# Patient Record
Sex: FEMALE | Race: ASIAN
[De-identification: names, ages, dates, MRNs, and addresses within clinical notes are randomized per-mention and may not be internally consistent; named-entity substitution may affect disease eponyms.]

---

## 2024-03-12 ENCOUNTER — HOSPITAL ENCOUNTER (OUTPATIENT)
Facility: HOSPITAL | Age: 34
Setting detail: RECURRING SERIES
Discharge: HOME | End: 2024-03-15
Payer: OTHER GOVERNMENT

## 2024-03-12 PROCEDURE — 97140 MANUAL THERAPY 1/> REGIONS: CPT

## 2024-03-12 PROCEDURE — 97112 NEUROMUSCULAR REEDUCATION: CPT

## 2024-03-12 PROCEDURE — 97110 THERAPEUTIC EXERCISES: CPT

## 2024-03-12 NOTE — PROGRESS NOTES
PHYSICAL / OCCUPATIONAL THERAPY - DAILY TREATMENT NOTE    Patient Name: Nahun Durant    Date: 3/12/2024    : 1990  Insurance: Payor: [employer] / Plan: [employer] / Product Type: *No Product type* /      Patient  verified Yes     Visit #   Current / Total 6 9-13   Time   In / Out 5:01 5:40   Pain   In / Out 0- pain/dizzy 0   Subjective Functional Status/Changes: Pt has only had one brief episode of dizziness with turning head to the left at the gas station quickly but it was gone almost immediately. States she does well with 1st set of supine chin tucks, but 2nd set causes increased pain (reviewed HEP which is just for 1 set of 10 and pt reports understanding)     TREATMENT AREA =  Vertigo [R42]    OBJECTIVE     Modalities Rationale:     decrease inflammation and decrease pain to improve patient's ability to progress to PLOF and address remaining functional goals.     min [] Estim Unattended, type/location:                                      []  w/ice    []  w/heat    min [] Estim Attended, type/location:                                     []  w/US     []  w/ice    []  w/heat    []  TENS insruct      min []  Mechanical Traction: type/lbs                   []  pro   []  sup   []  int   []  cont    []  before manual    []  after manual    min []  Ultrasound, settings/location:     ND min  unbill [x]  Ice     []  Heat    location/position: C/s in supine    min []  Paraffin,  details:     min []  Vasopneumatic Device, press/temp:     min []  Whirlpool / Fluido:    If using vaso (only need to measure limb vaso being performed on)      pre-treatment girth :       post-treatment girth :       measured at (landmark location) :      min []  Other:    Skin assessment post-treatment:   Intact      Therapeutic Procedures:    Tx Min Billable or 1:1 Min (if diff from Tx Min) Procedure, Rationale, Specifics   19  [id number] Therapeutic Exercise (timed):  increase ROM, strength, coordination, balance, and

## 2024-03-18 NOTE — PROGRESS NOTES
decreased cervical strain with ADL's.  Status at last note/certification: 3/5:  Mid trap R 4+5, L 4-/5. Low trap R 4-/5, L 3+/5  Current:   3/19: Goal not met:  Mid traps R 4+/5, L 4-/5.  Low trap R 4-/5, L 3+/5        RECOMMENDATIONS  Discontinue therapy. Progressing towards or have reached established goals. Pt request d/c to self management. Pt ed on appropriate long term HEP for self management and demonstrates good independence. Will d/c as pt has met or is progressing towards LTG's well.    EVELIN ATKINS, PT       3/19/2024       5:25 PM    Payor:  [employer] / Plan:  [employer] / Product Type: *No Product type* /      Not Medicaid Ins, no signature required for DC

## 2024-03-19 ENCOUNTER — HOSPITAL ENCOUNTER (OUTPATIENT)
Facility: HOSPITAL | Age: 34
Setting detail: RECURRING SERIES
Discharge: HOME | End: 2024-03-22
Payer: OTHER GOVERNMENT

## 2024-03-19 PROCEDURE — 97112 NEUROMUSCULAR REEDUCATION: CPT

## 2024-03-19 PROCEDURE — 97110 THERAPEUTIC EXERCISES: CPT

## 2024-03-19 PROCEDURE — 97530 THERAPEUTIC ACTIVITIES: CPT

## 2024-03-21 ENCOUNTER — APPOINTMENT (OUTPATIENT)
Facility: HOSPITAL | Age: 34
End: 2024-03-21
Payer: OTHER GOVERNMENT

## 2024-03-26 ENCOUNTER — HOSPITAL ENCOUNTER (OUTPATIENT)
Facility: HOSPITAL | Age: 34
Setting detail: RECURRING SERIES
End: 2024-03-26
Payer: OTHER GOVERNMENT

## 2024-03-28 ENCOUNTER — APPOINTMENT (OUTPATIENT)
Facility: HOSPITAL | Age: 34
End: 2024-03-28
Payer: OTHER GOVERNMENT